# Patient Record
Sex: FEMALE | Race: WHITE | Employment: UNEMPLOYED | URBAN - METROPOLITAN AREA
[De-identification: names, ages, dates, MRNs, and addresses within clinical notes are randomized per-mention and may not be internally consistent; named-entity substitution may affect disease eponyms.]

---

## 2017-11-08 ENCOUNTER — OFFICE VISIT (OUTPATIENT)
Dept: GASTROENTEROLOGY | Facility: CLINIC | Age: 57
End: 2017-11-08

## 2017-11-08 ENCOUNTER — TELEPHONE (OUTPATIENT)
Dept: GASTROENTEROLOGY | Facility: CLINIC | Age: 57
End: 2017-11-08

## 2017-11-08 VITALS
WEIGHT: 173 LBS | HEART RATE: 70 BPM | SYSTOLIC BLOOD PRESSURE: 126 MMHG | HEIGHT: 64 IN | BODY MASS INDEX: 29.53 KG/M2 | DIASTOLIC BLOOD PRESSURE: 82 MMHG

## 2017-11-08 DIAGNOSIS — Z12.12 ENCOUNTER FOR COLORECTAL CANCER SCREENING: Primary | ICD-10-CM

## 2017-11-08 DIAGNOSIS — Z12.11 ENCOUNTER FOR COLORECTAL CANCER SCREENING: Primary | ICD-10-CM

## 2017-11-08 PROCEDURE — 99212 OFFICE O/P EST SF 10 MIN: CPT | Performed by: INTERNAL MEDICINE

## 2017-11-08 PROCEDURE — 99203 OFFICE O/P NEW LOW 30 MIN: CPT | Performed by: INTERNAL MEDICINE

## 2017-11-08 RX ORDER — FLUTICASONE PROPIONATE 50 MCG
SPRAY, SUSPENSION (ML) NASAL
COMMUNITY

## 2017-11-08 RX ORDER — LISINOPRIL AND HYDROCHLOROTHIAZIDE 12.5; 1 MG/1; MG/1
TABLET ORAL
Refills: 0 | COMMUNITY
Start: 2017-11-06

## 2017-11-08 RX ORDER — CITALOPRAM 40 MG/1
TABLET ORAL
COMMUNITY
Start: 2016-11-14

## 2017-11-08 RX ORDER — METFORMIN HYDROCHLORIDE 500 MG/1
TABLET, EXTENDED RELEASE ORAL
Refills: 2 | COMMUNITY
Start: 2017-11-07

## 2017-11-08 RX ORDER — VALACYCLOVIR HYDROCHLORIDE 500 MG/1
500 TABLET, FILM COATED ORAL
COMMUNITY

## 2017-11-08 NOTE — TELEPHONE ENCOUNTER
RALPH    RN's    Dr. Eliane James ordered this pt to hold her Metformin the day before and the day of procedure. \"3. Medication adjustment:   A. Day BEFORE colonoscopy: HOLD metformin   B. Day OF colonoscopy: HOLD metformin   C.  Continue ALL other medicatio

## 2017-11-08 NOTE — TELEPHONE ENCOUNTER
Scheduled for:  Colonoscopy 59579  Provider Name: Dr. Bismark Ahuja  Date:  12/11/17  Location:  [facility]  Sedation:  IV sedation  Time:  9:30am , arrival 8:30 am   Prep: Suprep  Meds/Allergies Reconciled?:  Physician reviewed  3. Medication adjustment:   A.  Day BEFORE co

## 2017-11-08 NOTE — PATIENT INSTRUCTIONS
1. Schedule colonoscopy with Iv/conscious sedation with Dr. Viviane Arrington    2.  bowel prep from pharmacy ([facility])    3. Medication adjustment:       A. Day BEFORE colonoscopy: HOLD metformin     B. Day OF colonoscopy: HOLD metformin     C.  Contin

## 2017-11-08 NOTE — H&P
[facility], [facility] - Gastroenterology                                                                                                  Clinic History and Physical ROS:   all 10 systems were reviewed and were negative except as noted in the HPI    PHYSICAL EXAM:   Height 5' 4\" (1.626 m), weight 173 lb (78.5 kg).     General:awake, cooperative, no acute distress  HEENT: EOMI, no scleral icterus, MMM Imaging & Referrals:  None       Payt Joyner MD  [facility], [facility] - Gastroenterology  11/8/2017  9:11 AM

## 2017-12-11 ENCOUNTER — SURGERY (OUTPATIENT)
Age: 57
End: 2017-12-11

## 2017-12-11 ENCOUNTER — HOSPITAL ENCOUNTER (OUTPATIENT)
Facility: HOSPITAL | Age: 57
Setting detail: HOSPITAL OUTPATIENT SURGERY
Discharge: HOME | End: 2017-12-11
Attending: INTERNAL MEDICINE | Admitting: INTERNAL MEDICINE
Payer: COMMERCIAL

## 2017-12-11 DIAGNOSIS — Z12.11 ENCOUNTER FOR COLORECTAL CANCER SCREENING: ICD-10-CM

## 2017-12-11 DIAGNOSIS — Z12.12 ENCOUNTER FOR COLORECTAL CANCER SCREENING: ICD-10-CM

## 2017-12-11 PROCEDURE — 0DBK8ZX EXCISION OF ASCENDING COLON, VIA NATURAL OR ARTIFICIAL OPENING ENDOSCOPIC, DIAGNOSTIC: ICD-10-PCS | Performed by: INTERNAL MEDICINE

## 2017-12-11 PROCEDURE — 99153 MOD SED SAME PHYS/QHP EA: CPT | Performed by: INTERNAL MEDICINE

## 2017-12-11 PROCEDURE — G0500 MOD SEDAT ENDO SERVICE >5YRS: HCPCS | Performed by: INTERNAL MEDICINE

## 2017-12-11 PROCEDURE — 45380 COLONOSCOPY AND BIOPSY: CPT | Performed by: INTERNAL MEDICINE

## 2017-12-11 RX ORDER — SODIUM CHLORIDE, SODIUM LACTATE, POTASSIUM CHLORIDE, CALCIUM CHLORIDE 600; 310; 30; 20 MG/100ML; MG/100ML; MG/100ML; MG/100ML
INJECTION, SOLUTION INTRAVENOUS CONTINUOUS
Status: DISCONTINUED | OUTPATIENT
Start: 2017-12-11 | End: 2017-12-11

## 2017-12-11 RX ORDER — MIDAZOLAM HYDROCHLORIDE 1 MG/ML
1 INJECTION INTRAMUSCULAR; INTRAVENOUS EVERY 5 MIN PRN
Status: DISCONTINUED | OUTPATIENT
Start: 2017-12-11 | End: 2017-12-11

## 2017-12-11 RX ORDER — MIDAZOLAM HYDROCHLORIDE 1 MG/ML
INJECTION INTRAMUSCULAR; INTRAVENOUS
Status: DISCONTINUED | OUTPATIENT
Start: 2017-12-11 | End: 2017-12-11

## 2017-12-11 RX ORDER — SODIUM CHLORIDE 0.9 % (FLUSH) 0.9 %
10 SYRINGE (ML) INJECTION AS NEEDED
Status: DISCONTINUED | OUTPATIENT
Start: 2017-12-11 | End: 2017-12-11

## 2017-12-11 NOTE — H&P
History & Physical Examination    Patient Name: Sin Berry  MRN: [id number]  [facility]: [id number]  YOB: 1960    Diagnosis: colorectal cancer screening      Prescriptions Prior to Admission:  Citalopram Hydrobromide 40 MG Oral Tab TAKE 1 TABLET and alternatives of the procedure with the patient/family. They understand and agree to proceed with plan of care. I have reviewed the History and Physical done within the last 30 days. Any changes noted above.   Fredi Jesus MD  [facility], [facility] -

## 2017-12-11 NOTE — OPERATIVE REPORT
Colonoscopy Report    Mike Tejeda     1960 Age 65year old   PCP Geremias Landry Endoscopist Debora Scott MD     Date of procedure: 17    Procedure: Colonoscopy w/ biopsy    Pre-operative diagnosis: colorectal cancer screening immediate postoperative complications. The patient’s vital signs were monitored throughout the procedure and remained stable.     Estimated blood loss: insignificant    Specimens collected:  Colon polyp    Complications: none     Colonoscopy findings:  Term

## 2017-12-12 ENCOUNTER — TELEPHONE (OUTPATIENT)
Dept: GASTROENTEROLOGY | Facility: CLINIC | Age: 57
End: 2017-12-12

## 2017-12-12 VITALS
HEART RATE: 56 BPM | SYSTOLIC BLOOD PRESSURE: 119 MMHG | DIASTOLIC BLOOD PRESSURE: 79 MMHG | OXYGEN SATURATION: 95 % | HEIGHT: 63 IN | WEIGHT: 170 LBS | RESPIRATION RATE: 12 BRPM | BODY MASS INDEX: 30.12 KG/M2

## 2017-12-12 NOTE — TELEPHONE ENCOUNTER
Message   Received: Yesterday   Message Contents   Hay Car MD  [facility] Clinical Staff             GI staff: please place recall for colonoscopy in 5 years      Results letter mailed to patient and colon recall entered for 5 years.

## 2018-01-10 ENCOUNTER — TELEPHONE (OUTPATIENT)
Dept: GASTROENTEROLOGY | Facility: CLINIC | Age: 58
End: 2018-01-10

## 2018-01-10 NOTE — TELEPHONE ENCOUNTER
Daphne Arnold requesting copies of pt's CLN results - pls fax to [phone] attn: Dr. Fidelia Carmona . Pls call. Thank you.

## 2018-01-10 NOTE — TELEPHONE ENCOUNTER
Spoke to pt received verbal consent to fax over Pankaj Rose 65 results to Dr. Severiano Kern office. CLN report faxed over to Dr. Severiano Kern office, confirmation sheet received. No further action needed at this time.

## 2020-01-05 ENCOUNTER — HOSPITAL ENCOUNTER (OUTPATIENT)
Age: 60
Discharge: HOME | End: 2020-01-05
Attending: EMERGENCY MEDICINE
Payer: COMMERCIAL

## 2020-01-05 VITALS
DIASTOLIC BLOOD PRESSURE: 91 MMHG | HEART RATE: 68 BPM | OXYGEN SATURATION: 97 % | SYSTOLIC BLOOD PRESSURE: 136 MMHG | BODY MASS INDEX: 28 KG/M2 | TEMPERATURE: 98 F | RESPIRATION RATE: 18 BRPM | WEIGHT: 160 LBS

## 2020-01-05 DIAGNOSIS — J01.90 ACUTE NON-RECURRENT SINUSITIS, UNSPECIFIED LOCATION: Primary | ICD-10-CM

## 2020-01-05 DIAGNOSIS — R05.3 PERSISTENT COUGH: ICD-10-CM

## 2020-01-05 PROCEDURE — 99213 OFFICE O/P EST LOW 20 MIN: CPT

## 2020-01-05 PROCEDURE — 99204 OFFICE O/P NEW MOD 45 MIN: CPT

## 2020-01-05 RX ORDER — FLUTICASONE PROPIONATE 50 MCG
2 SPRAY, SUSPENSION (ML) NASAL DAILY
Qty: 16 G | Refills: 0 | Status: SHIPPED | OUTPATIENT
Start: 2020-01-05 | End: 2020-02-04

## 2020-01-05 RX ORDER — AMOXICILLIN 875 MG/1
875 TABLET, COATED ORAL 2 TIMES DAILY
Qty: 20 TABLET | Refills: 0 | Status: SHIPPED | OUTPATIENT
Start: 2020-01-05 | End: 2020-01-15

## 2020-01-05 RX ORDER — BENZONATATE 100 MG/1
100 CAPSULE ORAL 3 TIMES DAILY PRN
Qty: 30 CAPSULE | Refills: 0 | Status: SHIPPED | OUTPATIENT
Start: 2020-01-05 | End: 2020-02-04

## 2020-01-05 NOTE — ED PROVIDER NOTES
Patient Seen in: [facility] In [address] [place] [address]      History   Patient presents with:  Cough/URI    Stated Complaint: face pressure    HPI    The patient is a 43-year-old female with past history of borderline diabetes, hypertension who pre SpO2 97%   BMI 28.34 kg/m²         Physical Exam    Constitutional: Well-developed well-nourished in no acute distress  Head: Normocephalic, no swelling or tenderness  Eyes: Nonicteric sclera, no conjunctival injection  ENT: TMs are clear and flat bilater

## 2021-08-20 ENCOUNTER — HOSPITAL ENCOUNTER (OUTPATIENT)
Age: 61
Discharge: HOME | End: 2021-08-20
Payer: COMMERCIAL

## 2021-08-20 VITALS
DIASTOLIC BLOOD PRESSURE: 80 MMHG | TEMPERATURE: 97 F | HEIGHT: 62 IN | HEART RATE: 76 BPM | OXYGEN SATURATION: 99 % | SYSTOLIC BLOOD PRESSURE: 126 MMHG | BODY MASS INDEX: 29.81 KG/M2 | RESPIRATION RATE: 18 BRPM | WEIGHT: 162 LBS

## 2021-08-20 DIAGNOSIS — R30.0 DYSURIA: Primary | ICD-10-CM

## 2021-08-20 DIAGNOSIS — N39.0 URINARY TRACT INFECTION WITHOUT HEMATURIA, SITE UNSPECIFIED: ICD-10-CM

## 2021-08-20 LAB
BILIRUB UR QL STRIP: NEGATIVE
COLOR UR: YELLOW
GLUCOSE UR STRIP-MCNC: NEGATIVE MG/DL
NITRITE UR QL STRIP: POSITIVE
PH UR STRIP: 7 [PH]
PROT UR STRIP-MCNC: 100 MG/DL
SP GR UR STRIP: 1.02
UROBILINOGEN UR STRIP-ACNC: <2 MG/DL

## 2021-08-20 PROCEDURE — 99203 OFFICE O/P NEW LOW 30 MIN: CPT | Performed by: NURSE PRACTITIONER

## 2021-08-20 PROCEDURE — 87186 SC STD MICRODIL/AGAR DIL: CPT | Performed by: NURSE PRACTITIONER

## 2021-08-20 PROCEDURE — 81002 URINALYSIS NONAUTO W/O SCOPE: CPT | Performed by: NURSE PRACTITIONER

## 2021-08-20 PROCEDURE — 87088 URINE BACTERIA CULTURE: CPT | Performed by: NURSE PRACTITIONER

## 2021-08-20 PROCEDURE — 87086 URINE CULTURE/COLONY COUNT: CPT | Performed by: NURSE PRACTITIONER

## 2021-08-20 RX ORDER — NITROFURANTOIN 25; 75 MG/1; MG/1
100 CAPSULE ORAL 2 TIMES DAILY
Qty: 20 CAPSULE | Refills: 0 | Status: SHIPPED | OUTPATIENT
Start: 2021-08-20 | End: 2021-08-30

## 2021-08-20 RX ORDER — PHENAZOPYRIDINE HYDROCHLORIDE 100 MG/1
100 TABLET, FILM COATED ORAL 3 TIMES DAILY PRN
Qty: 6 TABLET | Refills: 0 | Status: SHIPPED | OUTPATIENT
Start: 2021-08-20 | End: 2021-08-27

## 2021-08-20 NOTE — ED PROVIDER NOTES
Patient Seen in: [facility] [place]    History   Patient presents with:  Urinary Symptoms    Stated Complaint: UTI    HPI    Patient complains of urinary frequency, urgency and dysuria that began 4 days ago.   Today is the worst.  Patient denies fever Labs Reviewed   [facility] POCT URINALYSIS DIPSTICK - Abnormal; Notable for the following components:       Result Value    Urine Clarity Slightly cloudy (*)     Protein urine 100  (*)     Ketone, Urine Trace (*)     Blood, Urine Small (*)     Nitrite Urine P 100 MG Oral Cap  Take 1 capsule (100 mg total) by mouth 2 (two) times daily for 10 days. Qty: 20 capsule Refills: 0    phenazopyridine 100 MG Oral Tab  Take 1 tablet (100 mg total) by mouth 3 (three) times daily as needed for Pain.   Qty: 6 tablet Refills: 1

## 2022-06-07 ENCOUNTER — HOSPITAL ENCOUNTER (OUTPATIENT)
Age: 62
Discharge: HOME | End: 2022-06-07
Payer: COMMERCIAL

## 2022-06-07 VITALS
TEMPERATURE: 98 F | DIASTOLIC BLOOD PRESSURE: 75 MMHG | SYSTOLIC BLOOD PRESSURE: 141 MMHG | WEIGHT: 160 LBS | RESPIRATION RATE: 15 BRPM | HEART RATE: 79 BPM | HEIGHT: 62 IN | BODY MASS INDEX: 29.44 KG/M2 | OXYGEN SATURATION: 99 %

## 2022-06-07 DIAGNOSIS — J98.8 VIRAL RESPIRATORY ILLNESS: Primary | ICD-10-CM

## 2022-06-07 DIAGNOSIS — B97.89 VIRAL RESPIRATORY ILLNESS: Primary | ICD-10-CM

## 2022-06-07 LAB
S PYO AG THROAT QL: NEGATIVE
SARS-COV-2 RNA RESP QL NAA+PROBE: NOT DETECTED

## 2022-06-07 PROCEDURE — 87880 STREP A ASSAY W/OPTIC: CPT | Performed by: NURSE PRACTITIONER

## 2022-06-07 PROCEDURE — 99213 OFFICE O/P EST LOW 20 MIN: CPT | Performed by: NURSE PRACTITIONER

## 2022-06-07 PROCEDURE — U0002 COVID-19 LAB TEST NON-CDC: HCPCS | Performed by: NURSE PRACTITIONER

## 2022-06-07 NOTE — ED INITIAL ASSESSMENT (HPI)
Patient presents a&o4/4 with complaints of headache, sore throat, cough since Sunday. Patient denies fever/chills.  NAD, respirations even and nonlabored

## 2022-10-11 ENCOUNTER — TELEPHONE (OUTPATIENT)
Dept: GASTROENTEROLOGY | Facility: CLINIC | Age: 62
End: 2022-10-11

## 2022-10-11 NOTE — TELEPHONE ENCOUNTER
----- Message from [address], [address] sent at 10/11/2022  8:12 AM CDT -----  Regarding: Recall Colon  Marvin Putnam CMA  [facility] Clinical Staff  Recall colon in 5 years per MG.  Colon done 12-11-17

## 2022-10-18 ENCOUNTER — TELEPHONE (OUTPATIENT)
Facility: CLINIC | Age: 62
End: 2022-10-18

## 2022-10-18 DIAGNOSIS — Z86.010 HISTORY OF ADENOMATOUS POLYP OF COLON: ICD-10-CM

## 2022-10-18 DIAGNOSIS — Z12.11 SCREENING FOR COLON CANCER: Primary | ICD-10-CM

## 2022-10-20 NOTE — TELEPHONE ENCOUNTER
Last Procedure, Date, MD: Colonoscopy, Dr. Sarita Cummings, 12/1/17   Last Diagnosis: tubular adenoma   Recalled for (mth/yrs): 5 years  Sedation used previously: IV   Last Prep Used (if known): n/a   Quality of prep (if known): good  Anticoagulants: n/a  Diabetic Meds: metformin  BP meds(Ace inhibitors/ARB's): lisinopril  Weight loss meds (phentermine/vyvanse): n/a  Iron supplement (RX/OTC): n/a  Height & Weight/BMI: 5'3\"/160lbs/28.3  Hx of Cardiac/CVA issues/(MI/Stroke): n/a  Devices Pacemaker/Defibrillator/Stents: n/a  Resp. Issues/Oxygen Use/JOSE/COPD: n/a  Issues w/Anesthesia: n/a    Symptoms (Y/N): N  Symptoms Details: n/a     Special comments/notes: verified allergies, medications, pharmacy    Patient requested to schedule before end of year since she met her deductible. Please advise on orders and prep, thank you.

## 2022-10-20 NOTE — TELEPHONE ENCOUNTER
Ok to schedule colonoscopy with MAC with split golytely for colorectal cancer screening and history of adenomatous colon polyp at [facility] or eosc    Hold metformin the day before and day of the procedure    Thanks    Gladys Thomason MD  [facility], [facility] - Gastroenterology

## 2022-11-16 NOTE — TELEPHONE ENCOUNTER
Scheduled for:  Colonoscopy 90700  Provider Name: Dr Cesilia George    Date:  2/21/23  Location:  [facility]   Sedation:  MAC  Time:  1:45pm, arrival 12:45pm   Prep:  Golytely   Meds/Allergies Reconciled?:  Physician reviewed     Diagnosis with codes:  Screening for colon cancer Z12.11, Hx of adenomatous colon polyps Z86.010  Was patient informed to call insurance with codes (Y/N):  Yes      Referral sent?:  Referral was sent at the time of electronic surgical scheduling. [facility] or [address] notified?:  I sent an electronic request to Endo Scheduling and received a confirmation today.      Medication Orders:  Hold Metformin the day before and morning of procedure   Hold Lisinopril-Hydrochlorothiazide the night before and morning of procedure   Misc Orders:  N/A      Further instructions given by staff:  Prep instructions were sent via [email or web address] per patient request

## 2023-01-03 ENCOUNTER — HOSPITAL ENCOUNTER (OUTPATIENT)
Age: 63
Discharge: HOME | End: 2023-01-03
Payer: COMMERCIAL

## 2023-01-03 VITALS
TEMPERATURE: 98 F | WEIGHT: 150 LBS | BODY MASS INDEX: 26.58 KG/M2 | DIASTOLIC BLOOD PRESSURE: 70 MMHG | SYSTOLIC BLOOD PRESSURE: 119 MMHG | HEIGHT: 63 IN | HEART RATE: 83 BPM | OXYGEN SATURATION: 100 % | RESPIRATION RATE: 18 BRPM

## 2023-01-03 DIAGNOSIS — J01.90 ACUTE SINUSITIS, RECURRENCE NOT SPECIFIED, UNSPECIFIED LOCATION: Primary | ICD-10-CM

## 2023-01-03 PROCEDURE — 99213 OFFICE O/P EST LOW 20 MIN: CPT | Performed by: NURSE PRACTITIONER

## 2023-01-03 RX ORDER — AMOXICILLIN AND CLAVULANATE POTASSIUM 875; 125 MG/1; MG/1
1 TABLET, FILM COATED ORAL 2 TIMES DAILY
Qty: 20 TABLET | Refills: 0 | Status: SHIPPED | OUTPATIENT
Start: 2023-01-03 | End: 2023-01-13

## 2023-01-04 NOTE — DISCHARGE INSTRUCTIONS
Push fluids. Rest.  Tylenol or ibuprofen as needed for pain or fever. Take the antibiotics as prescribed. Follow-up with your doctor. Return for any concerns.

## 2023-01-04 NOTE — ED INITIAL ASSESSMENT (HPI)
Pt reports cough, sinus pain, nasal congestion. This is day 9 of symptoms. At home covid test negative.

## 2023-02-15 RX ORDER — VENLAFAXINE HYDROCHLORIDE 150 MG/1
150 CAPSULE, EXTENDED RELEASE ORAL DAILY
COMMUNITY

## 2023-02-17 ENCOUNTER — TELEPHONE (OUTPATIENT)
Facility: CLINIC | Age: 63
End: 2023-02-17

## 2023-02-17 NOTE — TELEPHONE ENCOUNTER
Dr. Alessandra Rivera (office on call),    Patient requesting prep for procedure on 2/21. Orders pended below. Please review and sign if appropriate, thank you.

## 2023-02-21 ENCOUNTER — ANESTHESIA EVENT (OUTPATIENT)
Dept: ENDOSCOPY | Age: 63
End: 2023-02-21
Payer: COMMERCIAL

## 2023-02-21 ENCOUNTER — HOSPITAL ENCOUNTER (OUTPATIENT)
Age: 63
Setting detail: HOSPITAL OUTPATIENT SURGERY
Discharge: HOME | End: 2023-02-21
Attending: INTERNAL MEDICINE | Admitting: INTERNAL MEDICINE
Payer: COMMERCIAL

## 2023-02-21 ENCOUNTER — ANESTHESIA (OUTPATIENT)
Dept: ENDOSCOPY | Age: 63
End: 2023-02-21
Payer: COMMERCIAL

## 2023-02-21 VITALS
OXYGEN SATURATION: 99 % | SYSTOLIC BLOOD PRESSURE: 147 MMHG | WEIGHT: 160 LBS | HEART RATE: 58 BPM | HEIGHT: 63 IN | DIASTOLIC BLOOD PRESSURE: 91 MMHG | BODY MASS INDEX: 28.35 KG/M2 | RESPIRATION RATE: 15 BRPM

## 2023-02-21 DIAGNOSIS — Z12.11 SCREENING FOR COLON CANCER: ICD-10-CM

## 2023-02-21 DIAGNOSIS — Z86.010 HISTORY OF ADENOMATOUS POLYP OF COLON: ICD-10-CM

## 2023-02-21 LAB — GLUCOSE BLDC GLUCOMTR-MCNC: 132 MG/DL (ref 70–99)

## 2023-02-21 PROCEDURE — 88305 TISSUE EXAM BY PATHOLOGIST: CPT | Performed by: INTERNAL MEDICINE

## 2023-02-21 PROCEDURE — 82962 GLUCOSE BLOOD TEST: CPT

## 2023-02-21 RX ORDER — LIDOCAINE HYDROCHLORIDE 10 MG/ML
INJECTION, SOLUTION EPIDURAL; INFILTRATION; INTRACAUDAL; PERINEURAL AS NEEDED
Status: DISCONTINUED | OUTPATIENT
Start: 2023-02-21 | End: 2023-02-21 | Stop reason: SURG

## 2023-02-21 RX ORDER — SODIUM CHLORIDE, SODIUM LACTATE, POTASSIUM CHLORIDE, CALCIUM CHLORIDE 600; 310; 30; 20 MG/100ML; MG/100ML; MG/100ML; MG/100ML
INJECTION, SOLUTION INTRAVENOUS CONTINUOUS
Status: DISCONTINUED | OUTPATIENT
Start: 2023-02-21 | End: 2023-02-21

## 2023-02-21 RX ADMIN — SODIUM CHLORIDE, SODIUM LACTATE, POTASSIUM CHLORIDE, CALCIUM CHLORIDE: 600; 310; 30; 20 INJECTION, SOLUTION INTRAVENOUS at 13:15:00

## 2023-02-21 RX ADMIN — LIDOCAINE HYDROCHLORIDE 50 MG: 10 INJECTION, SOLUTION EPIDURAL; INFILTRATION; INTRACAUDAL; PERINEURAL at 13:15:00

## 2023-02-21 NOTE — ANESTHESIA POSTPROCEDURE EVALUATION
Patient: Bright Rosas    Procedure Summary     Date: 02/21/23 Room / Location: [facility] ENDOSCOPY 01 / [facility] ENDO    Anesthesia Start: 0181 Anesthesia Stop: 8723    Procedure: colonoscopy Diagnosis:       Screening for colon cancer      History of adenomatous polyp of colon      (diverticulosis, hemorrhoids)    Surgeons: Shelly Voss MD Anesthesiologist:     Anesthesia Type: MAC ASA Status: 2          Anesthesia Type: MAC    Vitals Value Taken Time   /74 02/21/23 1344   Temp  02/21/23 1345   Pulse 67 02/21/23 1344   Resp 22 02/21/23 1344   SpO2 99 % 02/21/23 1344   Vitals shown include unvalidated device data.     [facility] AN Post Evaluation:   Patient Evaluated in PACU  Patient Participation: complete - patient participated  Level of Consciousness: awake and alert  Pain Management: adequate  Airway Patency:patent  Yes    Cardiovascular Status: acceptable  Respiratory Status: acceptable      Nguyen Ley MD  2/21/2023 1:45 PM

## 2023-02-21 NOTE — DISCHARGE INSTRUCTIONS
Home Care Instructions for Colonoscopy with Sedation    Diet:  - Resume your regular diet as tolerated unless otherwise instructed. - Start with light meals to minimize bloating.  - Do not drink alcohol today. Medication:  - If you have questions about resuming your normal medications, please contact your Primary Care Physician. Activities:  - Take it easy today. Do not return to work today. - Do not drive today. - Do not operate any machinery today (including kitchen equipment). Colonoscopy:  - You may notice some rectal \"spotting\" (a little blood on the toilet tissue) for a day or two after the exam. This is normal.  - If you experience any rectal bleeding (not spotting), persistent tenderness or sharp severe abdominal pains, oral temperature over 100 degrees Fahrenheit, light-headedness or dizziness, or any other problems, contact your doctor. **If unable to reach your doctor, please go to the [facility] AND [facility] Emergency Room**    - Your referring physician will receive a full report of your examination.  - If you do not hear from your doctor's office within two weeks of your biopsy, please call them for your results. You may be able to see your laboratory results in [facility] between 4 and 7 business days. In some cases, your physician may not have viewed the results before they are released to [address] If you have questions regarding your results contact the physician who ordered the test/exam by phone or via [address] by choosing \"Ask a Medical Question. \"

## 2023-02-22 ENCOUNTER — MED REC SCAN ONLY (OUTPATIENT)
Facility: CLINIC | Age: 63
End: 2023-02-22

## 2023-02-22 ENCOUNTER — TELEPHONE (OUTPATIENT)
Dept: GASTROENTEROLOGY | Facility: CLINIC | Age: 63
End: 2023-02-22

## 2023-02-22 NOTE — TELEPHONE ENCOUNTER
Lorenzo Robles MD  [facility] Clinical Staff  GI staff: please place recall in for colonoscopy in 10 years       Letter sent on 2/22/2023  [facility] Updated   Patient Outreach Updated

## 2023-03-03 ENCOUNTER — TELEPHONE (OUTPATIENT)
Facility: CLINIC | Age: 63
End: 2023-03-03

## 2023-03-03 NOTE — TELEPHONE ENCOUNTER
Called and spoke to the patient,  verified. She had the procedure at [facility] on 2023. She lost her  license. Called [facility], no answer. Asked the patient to call back on Monday.

## 2023-03-03 NOTE — TELEPHONE ENCOUNTER
Pt is requesting to speak directly to [address] or Rn, she states she is missing some belongings her ID and she last had it when at the procedure.  Pt is wondering if anything has been found please call thanks

## 2023-03-29 ENCOUNTER — HOSPITAL ENCOUNTER (OUTPATIENT)
Age: 63
Discharge: HOME | End: 2023-03-29
Payer: COMMERCIAL

## 2023-03-29 VITALS
HEART RATE: 89 BPM | SYSTOLIC BLOOD PRESSURE: 147 MMHG | DIASTOLIC BLOOD PRESSURE: 77 MMHG | OXYGEN SATURATION: 97 % | TEMPERATURE: 97 F | RESPIRATION RATE: 18 BRPM

## 2023-03-29 DIAGNOSIS — J32.9 RECURRENT SINUSITIS: Primary | ICD-10-CM

## 2023-03-29 PROCEDURE — 99213 OFFICE O/P EST LOW 20 MIN: CPT | Performed by: NURSE PRACTITIONER

## 2023-03-29 RX ORDER — DOXYCYCLINE HYCLATE 100 MG/1
100 CAPSULE ORAL 2 TIMES DAILY
Qty: 14 CAPSULE | Refills: 0 | Status: SHIPPED | OUTPATIENT
Start: 2023-03-29 | End: 2023-04-05

## 2023-03-29 NOTE — DISCHARGE INSTRUCTIONS
Take doxycycline daily. Continue over-the-counter remedies. Follow-up with your primary doctor.   Consider ENT consult if sinus symptoms are recurrent

## 2025-03-20 ENCOUNTER — HOSPITAL ENCOUNTER (OUTPATIENT)
Age: 65
Discharge: ANOTHER HOSPITAL | End: 2025-03-20
Payer: COMMERCIAL

## 2025-03-20 ENCOUNTER — HOSPITAL ENCOUNTER (EMERGENCY)
Facility: HOSPITAL | Age: 65
Discharge: HOME | End: 2025-03-20
Attending: EMERGENCY MEDICINE
Payer: COMMERCIAL

## 2025-03-20 ENCOUNTER — APPOINTMENT (OUTPATIENT)
Dept: CT IMAGING | Facility: HOSPITAL | Age: 65
End: 2025-03-20
Attending: EMERGENCY MEDICINE
Payer: COMMERCIAL

## 2025-03-20 VITALS
DIASTOLIC BLOOD PRESSURE: 69 MMHG | HEIGHT: 63 IN | SYSTOLIC BLOOD PRESSURE: 110 MMHG | OXYGEN SATURATION: 97 % | TEMPERATURE: 98 F | HEART RATE: 65 BPM | WEIGHT: 148 LBS | RESPIRATION RATE: 18 BRPM | BODY MASS INDEX: 26.22 KG/M2

## 2025-03-20 VITALS
SYSTOLIC BLOOD PRESSURE: 127 MMHG | RESPIRATION RATE: 18 BRPM | HEART RATE: 69 BPM | TEMPERATURE: 98 F | DIASTOLIC BLOOD PRESSURE: 63 MMHG | OXYGEN SATURATION: 99 %

## 2025-03-20 DIAGNOSIS — R10.32 LLQ PAIN: Primary | ICD-10-CM

## 2025-03-20 LAB
ALBUMIN SERPL-MCNC: 5 G/DL (ref 3.2–4.8)
ALBUMIN/GLOB SERPL: 2.4 {RATIO} (ref 1–2)
ALP LIVER SERPL-CCNC: 36 U/L
ALT SERPL-CCNC: 18 U/L
ANION GAP SERPL CALC-SCNC: 6 MMOL/L (ref 0–18)
AST SERPL-CCNC: 18 U/L (ref ?–34)
BASOPHILS # BLD AUTO: 0.05 X10(3) UL (ref 0–0.2)
BASOPHILS NFR BLD AUTO: 0.5 %
BILIRUB SERPL-MCNC: 0.4 MG/DL (ref 0.2–1.1)
BILIRUB UR QL: NEGATIVE
BUN BLD-MCNC: 17 MG/DL (ref 9–23)
BUN/CREAT SERPL: 22.7 (ref 10–20)
CALCIUM BLD-MCNC: 9.7 MG/DL (ref 8.7–10.4)
CHLORIDE SERPL-SCNC: 106 MMOL/L (ref 98–112)
CLARITY UR: CLEAR
CO2 SERPL-SCNC: 25 MMOL/L (ref 21–32)
COLOR UR: COLORLESS
CREAT BLD-MCNC: 0.75 MG/DL
DEPRECATED RDW RBC AUTO: 40.5 FL (ref 35.1–46.3)
EGFRCR SERPLBLD CKD-EPI 2021: 89 ML/MIN/1.73M2 (ref 60–?)
EOSINOPHIL # BLD AUTO: 0.19 X10(3) UL (ref 0–0.7)
EOSINOPHIL NFR BLD AUTO: 1.8 %
ERYTHROCYTE [DISTWIDTH] IN BLOOD BY AUTOMATED COUNT: 12.5 % (ref 11–15)
GLOBULIN PLAS-MCNC: 2.1 G/DL (ref 2–3.5)
GLUCOSE BLD-MCNC: 106 MG/DL (ref 70–99)
GLUCOSE UR-MCNC: NORMAL MG/DL
HCT VFR BLD AUTO: 35.6 %
HGB BLD-MCNC: 12.6 G/DL
HGB UR QL STRIP.AUTO: NEGATIVE
IMM GRANULOCYTES # BLD AUTO: 0.04 X10(3) UL (ref 0–1)
IMM GRANULOCYTES NFR BLD: 0.4 %
KETONES UR-MCNC: NEGATIVE MG/DL
LEUKOCYTE ESTERASE UR QL STRIP.AUTO: NEGATIVE
LYMPHOCYTES # BLD AUTO: 2.12 X10(3) UL (ref 1–4)
LYMPHOCYTES NFR BLD AUTO: 19.9 %
MCH RBC QN AUTO: 31.4 PG (ref 26–34)
MCHC RBC AUTO-ENTMCNC: 35.4 G/DL (ref 31–37)
MCV RBC AUTO: 88.8 FL
MONOCYTES # BLD AUTO: 0.91 X10(3) UL (ref 0.1–1)
MONOCYTES NFR BLD AUTO: 8.5 %
NEUTROPHILS # BLD AUTO: 7.34 X10 (3) UL (ref 1.5–7.7)
NEUTROPHILS # BLD AUTO: 7.34 X10(3) UL (ref 1.5–7.7)
NEUTROPHILS NFR BLD AUTO: 68.9 %
NITRITE UR QL STRIP.AUTO: NEGATIVE
OSMOLALITY SERPL CALC.SUM OF ELEC: 286 MOSM/KG (ref 275–295)
PH UR: 5.5 [PH] (ref 5–8)
PLATELET # BLD AUTO: 254 10(3)UL (ref 150–450)
POTASSIUM SERPL-SCNC: 4 MMOL/L (ref 3.5–5.1)
PROT SERPL-MCNC: 7.1 G/DL (ref 5.7–8.2)
PROT UR-MCNC: NEGATIVE MG/DL
RBC # BLD AUTO: 4.01 X10(6)UL
SODIUM SERPL-SCNC: 137 MMOL/L (ref 136–145)
SP GR UR STRIP: 1.01 (ref 1–1.03)
UROBILINOGEN UR STRIP-ACNC: NORMAL
WBC # BLD AUTO: 10.7 X10(3) UL (ref 4–11)

## 2025-03-20 PROCEDURE — 99285 EMERGENCY DEPT VISIT HI MDM: CPT

## 2025-03-20 PROCEDURE — 96374 THER/PROPH/DIAG INJ IV PUSH: CPT

## 2025-03-20 PROCEDURE — 99215 OFFICE O/P EST HI 40 MIN: CPT | Performed by: NURSE PRACTITIONER

## 2025-03-20 PROCEDURE — 74177 CT ABD & PELVIS W/CONTRAST: CPT | Performed by: EMERGENCY MEDICINE

## 2025-03-20 PROCEDURE — 80053 COMPREHEN METABOLIC PANEL: CPT | Performed by: EMERGENCY MEDICINE

## 2025-03-20 PROCEDURE — 99284 EMERGENCY DEPT VISIT MOD MDM: CPT

## 2025-03-20 PROCEDURE — 85025 COMPLETE CBC W/AUTO DIFF WBC: CPT | Performed by: EMERGENCY MEDICINE

## 2025-03-20 PROCEDURE — 81003 URINALYSIS AUTO W/O SCOPE: CPT | Performed by: EMERGENCY MEDICINE

## 2025-03-20 RX ORDER — AMLODIPINE BESYLATE 2.5 MG/1
TABLET ORAL
COMMUNITY
Start: 2025-02-18

## 2025-03-20 RX ORDER — HYDROCHLOROTHIAZIDE 12.5 MG/1
12.5 CAPSULE ORAL DAILY
COMMUNITY

## 2025-03-20 RX ORDER — ROSUVASTATIN CALCIUM 10 MG/1
10 TABLET, COATED ORAL DAILY
COMMUNITY
Start: 2022-09-01

## 2025-03-20 RX ORDER — KETOROLAC TROMETHAMINE 15 MG/ML
15 INJECTION, SOLUTION INTRAMUSCULAR; INTRAVENOUS ONCE
Status: COMPLETED | OUTPATIENT
Start: 2025-03-20 | End: 2025-03-20

## 2025-03-20 RX ORDER — LISINOPRIL 20 MG/1
1 TABLET ORAL AS DIRECTED
COMMUNITY

## 2025-03-20 NOTE — ED INITIAL ASSESSMENT (HPI)
PT to ED, steady gait, alert and orientated x4.     PT reporting abd pain since this morning LLQ, sent by IC, reports has been having \"a cycle of diarrhea and constipation for months\".

## 2025-03-20 NOTE — ED PROVIDER NOTES
Patient Seen in: [facility]      History     Chief Complaint   Patient presents with    Abdominal Pain     Stated Complaint: abd pain    Subjective:   HPI      64-year-old female with diabetes, hypertension, dyslipidemia presents today with complaints of left lower quadrant pain that worsened today.  Patient states over the past 2 weeks she has had intermittent diarrhea and constipation that has led to left lower quadrant pain.  Patient states that she left work early today because the pain got worse.  Patient states she has been eating very clean and has increased her fruits and vegetable intake.  Patient states that she has had 2 colonoscopies in her past and the first colonoscopy she did have polyps a second colonoscopy no polyps.    Objective:     Past Medical History:    Colon adenoma    repeat colonoscopy 12/2022    Depression    Diabetes (HCC)    Diabetes mellitus (HCC)    boarder line    Essential hypertension    High blood pressure    Hyperlipidemia    Prediabetes              Past Surgical History:   Procedure Laterality Date    Cholecystectomy  1988    Colonoscopy N/A 12/11/2017    Procedure: COLONOSCOPY;  Surgeon: Isidro Ryan MD;  Location: [facility] ENDOSCOPY    Colonoscopy  02/21/2023    Colonoscopy N/A 2/21/2023    Procedure: colonoscopy;  Surgeon: Isidro Ryan MD;  Location: [facility] ENDO    Hysterectomy      2000    Total abdom hysterectomy  1999                Social History     Socioeconomic History    Marital status:    Tobacco Use    Smoking status: Never    Smokeless tobacco: Never   Substance and Sexual Activity    Alcohol use: Yes     Comment: wine, 2-3 nights a week    Drug use: No   Social History Narrative    ** Merged History Encounter **          Social Drivers of Health      Received from [facility]              Review of Systems   Constitutional: Negative.    HENT: Negative.     Eyes: Negative.    Respiratory: Negative.     Cardiovascular:  Negative.    Gastrointestinal:  Positive for abdominal pain, constipation and diarrhea.   Endocrine: Negative.    Genitourinary: Negative.    Musculoskeletal: Negative.    Skin: Negative.    Allergic/Immunologic: Negative.    Neurological: Negative.    Hematological: Negative.    Psychiatric/Behavioral: Negative.         Positive for stated complaint: abd pain  Other systems are as noted in HPI.  Constitutional and vital signs reviewed.      All other systems reviewed and negative except as noted above.    Physical Exam     ED Triage Vitals [03/20/25 1743]   /63   Pulse 69   Resp 18   Temp 98 °F (36.7 °C)   Temp src Oral   SpO2 99 %   O2 Device None (Room air)       Current Vitals:   Vital Signs  BP: 127/63  Pulse: 69  Resp: 18  Temp: 98 °F (36.7 °C)  Temp src: Oral    Oxygen Therapy  SpO2: 99 %  O2 Device: None (Room air)        Physical Exam  Vitals and nursing note reviewed. Exam conducted with a chaperone present.   Constitutional:       Appearance: She is well-developed.   HENT:      Head: Normocephalic.   Cardiovascular:      Rate and Rhythm: Normal rate and regular rhythm.      Heart sounds: Normal heart sounds.   Pulmonary:      Effort: Pulmonary effort is normal.      Breath sounds: Normal breath sounds.   Abdominal:      General: Abdomen is flat. Bowel sounds are normal.      Palpations: Abdomen is soft.      Tenderness: There is abdominal tenderness in the right lower quadrant and left lower quadrant. There is guarding and rebound.   Neurological:      General: No focal deficit present.      Mental Status: She is alert.           ED Course   Labs Reviewed - No data to display                MDM      64-year-old female with diabetes, hypertension, dyslipidemia presents today with complaints of left lower quadrant pain that worsened today.  Patient states over the past 2 weeks she has had intermittent diarrhea and constipation that has led to left lower quadrant pain.  Patient states that she left  work early today because the pain got worse.  Patient states she has been eating very clean and has increased her fruits and vegetable intake.  Patient states that she has had 2 colonoscopies in her past and the first colonoscopy she did have polyps a second colonoscopy no polyps.  Vital signs: Please see EMR.  Physical exam: Please see exam.  Differential diagnosis: Diverticulitis, diverticulosis, cystitis, appendicitis, IBS.  Based on physical exam and HPI will diagnosed with left lower quadrant pain.  Patient is being referred to the emergency room for further evaluation.  We do not have CAT scan capabilities here at this [facility] care center and at this hour will not be able to follow-up with the patient to hold and call as we will likely be closed at that time.  Instructed patient to remain n.p.o. until advised by the ED staff otherwise.  Patient's  will drive her to [place] ER.        Medical Decision Making  64-year-old female with diabetes, hypertension, dyslipidemia presents today with complaints of left lower quadrant pain that worsened today.  Patient states over the past 2 weeks she has had intermittent diarrhea and constipation that has led to left lower quadrant pain.  Patient states that she left work early today because the pain got worse.  Patient states she has been eating very clean and has increased her fruits and vegetable intake.  Patient states that she has had 2 colonoscopies in her past and the first colonoscopy she did have polyps a second colonoscopy no polyps.    Problems Addressed:  LLQ pain: acute illness or injury    Risk  Decision regarding hospitalization.        Disposition and Plan     Clinical Impression:  1. LLQ pain         Disposition:  Ic to ed  3/20/2025  5:59 pm    Follow-up:  No follow-up provider specified.        Medications Prescribed:  Discharge Medication List as of 3/20/2025  6:02 PM              Supplementary Documentation:

## 2025-03-20 NOTE — ED INITIAL ASSESSMENT (HPI)
Constant 7/10 LLQ pain that started this afternoon. Reports diarrhea and constipation that have been alternating over the past 2 weeks.

## 2025-03-21 ENCOUNTER — OFFICE VISIT (OUTPATIENT)
Facility: CLINIC | Age: 65
End: 2025-03-21
Payer: COMMERCIAL

## 2025-03-21 VITALS
HEART RATE: 78 BPM | WEIGHT: 145 LBS | BODY MASS INDEX: 25.69 KG/M2 | HEIGHT: 63 IN | SYSTOLIC BLOOD PRESSURE: 112 MMHG | DIASTOLIC BLOOD PRESSURE: 73 MMHG

## 2025-03-21 DIAGNOSIS — K59.00 CONSTIPATION, UNSPECIFIED CONSTIPATION TYPE: ICD-10-CM

## 2025-03-21 DIAGNOSIS — R10.30 LOWER ABDOMINAL PAIN: Primary | ICD-10-CM

## 2025-03-21 DIAGNOSIS — R19.7 DIARRHEA, UNSPECIFIED TYPE: ICD-10-CM

## 2025-03-21 PROCEDURE — 3074F SYST BP LT 130 MM HG: CPT

## 2025-03-21 PROCEDURE — 99203 OFFICE O/P NEW LOW 30 MIN: CPT

## 2025-03-21 PROCEDURE — 3078F DIAST BP <80 MM HG: CPT

## 2025-03-21 PROCEDURE — 3008F BODY MASS INDEX DOCD: CPT

## 2025-03-21 RX ORDER — POLYETHYLENE GLYCOL 3350 17 G/17G
17 POWDER, FOR SOLUTION ORAL DAILY
Qty: 238 G | Refills: 0 | Status: SHIPPED | OUTPATIENT
Start: 2025-03-21

## 2025-03-21 NOTE — ED PROVIDER NOTES
Patient Seen in: [facility] Emergency Department    History     Chief Complaint   Patient presents with    Abdomen/Flank Pain       HPI    Patient presents to the ED complaining of left lower quadrant abdominal pain since this morning.  She states that she has had intermittent diarrhea and constipation for the last several months.  Pain is new.  No other complaints.    History reviewed.   Past Medical History:    Colon adenoma    repeat colonoscopy 12/2022    Depression    Diabetes (HCC)    Diabetes mellitus (HCC)    boarder line    Essential hypertension    High blood pressure    Hyperlipidemia    Prediabetes       History reviewed.   Past Surgical History:   Procedure Laterality Date    Cholecystectomy  1988    Colonoscopy N/A 12/11/2017    Procedure: COLONOSCOPY;  Surgeon: Isidro Ryan MD;  Location: [facility] ENDOSCOPY    Colonoscopy  02/21/2023    Colonoscopy N/A 2/21/2023    Procedure: colonoscopy;  Surgeon: Isidro Ryan MD;  Location: [facility] ENDO    Hysterectomy      2000    Total abdom hysterectomy  1999         Medications :  Prescriptions Prior to Admission[1]     Family History   Problem Relation Age of Onset    Cancer Father         prostate    Cancer Paternal Aunt         breast    Cancer Paternal Aunt         breast       Smoking Status:   Social History     Socioeconomic History    Marital status:    Tobacco Use    Smoking status: Never    Smokeless tobacco: Never   Substance and Sexual Activity    Alcohol use: Yes     Comment: wine, 2-3 nights a week    Drug use: No       Constitutional and vital signs reviewed.      Social History and Family History elements reviewed from today, pertinent positives to the presenting problem noted.    Physical Exam     ED Triage Vitals [03/20/25 1847]   /69   Pulse 69   Resp 20   Temp 97.7 °F (36.5 °C)   Temp src Oral   SpO2 95 %   O2 Device None (Room air)       All measures to prevent infection transmission during my interaction with the  patient were taken. Handwashing was performed prior to and after the exam.  Stethoscope and any equipment used during my examination was cleaned with super sani-cloth germicidal wipes following the exam.     Physical Exam  Vitals and nursing note reviewed.   Constitutional:       General: She is not in acute distress.     Appearance: She is well-developed. She is not ill-appearing or toxic-appearing.   HENT:      Head: Normocephalic and atraumatic.   Eyes:      General:         Right eye: No discharge.         Left eye: No discharge.      Conjunctiva/sclera: Conjunctivae normal.   Neck:      Trachea: No tracheal deviation.   Cardiovascular:      Rate and Rhythm: Normal rate.   Pulmonary:      Effort: Pulmonary effort is normal. No respiratory distress.      Breath sounds: No stridor.   Abdominal:      General: There is no distension.      Palpations: Abdomen is soft.      Tenderness: There is abdominal tenderness in the left lower quadrant. There is no guarding or rebound.   Musculoskeletal:         General: No deformity.   Skin:     General: Skin is warm and dry.   Neurological:      Mental Status: She is alert and oriented to person, place, and time.   Psychiatric:         Mood and Affect: Mood normal.         Behavior: Behavior normal.         ED Course        Labs Reviewed   URINALYSIS WITH CULTURE REFLEX - Abnormal; Notable for the following components:       Result Value    Urine Color Colorless (*)     All other components within normal limits   COMP METABOLIC PANEL (14) - Abnormal; Notable for the following components:    Glucose 106 (*)     BUN/CREA Ratio 22.7 (*)     Alkaline Phosphatase 36 (*)     Albumin 5.0 (*)     A/G Ratio 2.4 (*)     All other components within normal limits   CBC WITH DIFFERENTIAL WITH PLATELET       As Interpreted by me    Imaging Results Available and Reviewed while in ED: CT ABDOMEN+PELVIS(CONTRAST ONLY)(CPT=74177)    Result Date: 3/20/2025  CONCLUSION:  1. Moderate to large  amount of stool in colon suggests constipation.  Colonic diverticulosis.  No diverticulitis. 2. Nonspecific generalized increase in small bowel fluid may be related to stasis. 3. 11 mm incompletely characterized right hepatic lesion.  Findings probably either represent a flash filling hemangioma or a small shunt.  Nonemergent MRI could be obtained for further evaluation.     Dictated by (CST): Abdulaziz Burciaga MD on 3/20/2025 at 9:40 PM     Finalized by (CST): Abdulaziz Burciaga MD on 3/20/2025 at 9:50 PM         ED Medications Administered:   Medications   ketorolac (Toradol) 15 MG/ML injection 15 mg (15 mg Intravenous Given 3/20/25 2053)   iopamidol 76% (ISOVUE-370) injection for power injector (80 mL Intravenous Given 3/20/25 2105)         MDM     Vitals:    03/20/25 1847 03/20/25 2228   BP: 118/69 110/69   Pulse: 69 65   Resp: 20 18   Temp: 97.7 °F (36.5 °C)    TempSrc: Oral    SpO2: 95% 97%   Weight: 67.1 kg    Height: 160 cm (5' 3\")      *I personally reviewed and interpreted all ED vitals.    Pulse Ox: 97%, Room air, Normal     Differential Diagnosis/ Diagnostic Considerations: Acute diverticulitis, perforated viscus, colitis, other    Complicating Factors: The patient already has does not have any pertinent problems on file. to contribute to the complexity of this ED evaluation.    Medical Decision Making  Patient presents to the ED with left lower quadrant abdominal pain.  Nondistressed on examination.  Laboratory testing without concerning findings and CT without significant findings.  Patient feeling better in the ED with Toradol and I feel stable for discharge with outpatient GI follow-up for chronic GI symptoms.    Problems Addressed:  LLQ pain: acute illness or injury that poses a threat to life or bodily functions    Amount and/or Complexity of Data Reviewed  Labs: ordered. Decision-making details documented in ED Course.  Radiology: ordered and independent interpretation performed. Decision-making details  documented in ED Course.     Details: I personally reviewed the patient's CT abdomen pelvis and noted no free fluid or SBO        Condition upon leaving the department: Stable    Disposition and Plan     Clinical Impression:  1. LLQ pain        Disposition:  Discharge    Follow-up:  Maciej Sen  [address]  [address]  [facility] [address]  [phone]    Schedule an appointment as soon as possible for a visit in 3 day(s)      Ben Monsivais MD  [address]  [facility] [address]  [phone]    Schedule an appointment as soon as possible for a visit in 1 week(s)        Medications Prescribed:  Discharge Medication List as of 3/20/2025 10:28 PM                           [1] (Not in a hospital admission)

## 2025-03-21 NOTE — PATIENT INSTRUCTIONS
# constipation, lower abdominal pain, diarrhea  - high fiber, increase fluids  - light exercise  - miralax twice a day for 1 month, then once a day for 1 month  - trial prunes, prune juice, papaya  - colace or dulcolax for increased constipation of lower abdominal pain.     - follow up in 2 months

## 2025-03-21 NOTE — H&P
[facility] - Gastroenterology                                                                                                                 Requesting physician or provider: KARLENE TOBIAS DO    Chief Complaint   Patient presents with    Follow - Up     ER        HPI:   Audelia Mata is a 64 year old year-old female with history of diabetes, hypertension, hyperlipidemia.   Pt was seen in the ER for left lower abdominal pain intermittent diarrhea and constipation for the last several months. Additional symptom includes bloating. Pt states that yesterday she had excruciating pain all day, which made her come to the ER. This morning the pain was pretty much gone, small bm this morning. Pt states she has tried metamucil in the past for 1 month with little to no difference.   Denies: melena, hematochezia, hematemesis, abd pain, abd surgery, loss of appetite, changes in stool or bowel habits, N/V/D, weight gain/loss    Prior endoscopies:  10/20/23  Impression:  1. Mild sigmoid colon diverticulosis  2. Very small non-bleeding hemorrhoids  3. Otherwise, unremarkable colonoscopy to the terminal ileum  Recommend:  10 yr recall  12/12/2017  Impression:  1. A 3 mm mid ascending colon polyp removed by cold biopsy forceps  2. Diverticulosis: mild in the sigmoid colon  3. Small non-bleeding hemorrohoids  Recommend:  5 yr recall    Soc:  -denies smoking  -occasional Etoh    Wt Readings from Last 6 Encounters:   03/21/25 145 lb (65.8 kg)   03/20/25 148 lb (67.1 kg)   02/15/23 160 lb (72.6 kg)   01/03/23 150 lb (68 kg)   06/07/22 160 lb (72.6 kg)   08/20/21 162 lb (73.5 kg)        History, Medications, Allergies, ROS:      Past Medical History:    Colon adenoma    repeat colonoscopy 12/2022    Depression    Diabetes (HCC)    Diabetes mellitus (HCC)    boarder line    Essential hypertension    High blood pressure    Hyperlipidemia    Prediabetes      Past Surgical History:   Procedure  Laterality Date    Cholecystectomy  1988    Colonoscopy N/A 12/11/2017    Procedure: COLONOSCOPY;  Surgeon: Isidro Ryan MD;  Location: [facility] ENDOSCOPY    Colonoscopy  02/21/2023    Colonoscopy N/A 2/21/2023    Procedure: colonoscopy;  Surgeon: Isidro Ryan MD;  Location: [facility] ENDO    Hysterectomy      2000    Total abdom hysterectomy  1999      Family Hx:   Family History   Problem Relation Age of Onset    Cancer Father         prostate    Cancer Paternal Aunt         breast    Cancer Paternal Aunt         breast      Social History:   Social History     Socioeconomic History    Marital status:    Tobacco Use    Smoking status: Never    Smokeless tobacco: Never   Substance and Sexual Activity    Alcohol use: Yes     Comment: wine, 2-3 nights a week    Drug use: No   Social History Narrative    ** Merged History Encounter **          Social Drivers of Health      Received from [facility]        Medications (Active prior to today's visit):  Current Outpatient Medications   Medication Sig Dispense Refill    polyethylene glycol, PEG 3350, 17 GM/SCOOP Oral Powder Take 17 g by mouth daily. 238 g 0    amLODIPine 2.5 MG Oral Tab       rosuvastatin 10 MG Oral Tab Take 1 tablet (10 mg total) by mouth daily.      metFORMIN HCl 1000 MG Oral Tab       hydroCHLOROthiazide 12.5 MG Oral Cap Take 1 capsule (12.5 mg total) by mouth daily.      Lisinopril-Hydrochlorothiazide 10-12.5 MG Oral Tab TK 1 T PO D  0    lisinopril 20 MG Oral Tab Take 1 tablet (20 mg total) by mouth As Directed.      venlafaxine  MG Oral Capsule SR 24 Hr Take 1 capsule (150 mg total) by mouth daily. (Patient not taking: Reported on 3/20/2025)      Citalopram Hydrobromide 40 MG Oral Tab TAKE 1 TABLET BY MOUTH DAILY (Patient not taking: Reported on 2/21/2023)      Fluticasone Propionate 50 MCG/ACT Nasal Suspension by Nasal route.      MetFORMIN HCl  MG Oral Tablet 24 Hr   2    ValACYclovir HCl 500 MG Oral Tab Take  500 mg by mouth.         Allergies:  Allergies[1]    ROS:   CONSTITUTIONAL:  negative for fevers, rigors  EYES:  negative for diplopia   RESPIRATORY:  negative for severe shortness of breath  CARDIOVASCULAR:  negative for crushing sub-sternal chest pain  GASTROINTESTINAL:  see HPI  GENITOURINARY:  negative for dysuria or gross hematuria  INTEGUMENT/BREAST:  SKIN:  negative for jaundice   ALLERGIC/IMMUNOLOGIC:  negative for hay fever  ENDOCRINE:  negative for cold intolerance and heat intolerance  MUSCULOSKELETAL:  negative for joint effusion/severe erythema  BEHAVIOR/PSYCH:  negative for psychotic behavior      PHYSICAL EXAM:   Blood pressure 112/73, pulse 78, height 5' 3\" (1.6 m), weight 145 lb (65.8 kg), not currently breastfeeding.    Gen- Patient appears comfortable and in no acute discomfort  HEENT: the sclera appears anicteric, oropharynx clear, mucus membranes appear moist  CV- regular rate and rhythm, the extremities are warm and well perfused   Lung- Moves air well; No labored breathing  Abdomen- soft, non-tender exam in all quadrants without rigidity or guarding, non-distended, no abnormal bowel sounds noted, no masses are palpated  Skin- No jaundice  Ext: no cyanosis, clubbing or edema is evident.   Neuro- Alert and interactive, and gross movements of extremities normal  Psych - appropriate, non-agitated    Labs/Imaging:     Patient's pertinent labs and imaging were reviewed and discussed with patient today.      Lab Results   Component Value Date     (H) 03/20/2025     03/20/2025    K 4.0 03/20/2025     03/20/2025    CO2 25.0 03/20/2025    ANIONGAP 6 03/20/2025    BUN 17 03/20/2025    CREATSERUM 0.75 03/20/2025    BUNCREA 22.7 (H) 03/20/2025    CA 9.7 03/20/2025    OSMOCALC 286 03/20/2025    EGFRCR 89 03/20/2025    ALT 18 03/20/2025    AST 18 03/20/2025    ALKPHO 36 (L) 03/20/2025    BILT 0.4 03/20/2025    TP 7.1 03/20/2025    ALB 5.0 (H) 03/20/2025    GLOBULIN 2.1 03/20/2025     ELECTAG 2.4 (H) 03/20/2025       Lab Results   Component Value Date    RBC 4.01 03/20/2025    HGB 12.6 03/20/2025    HCT 35.6 03/20/2025    MCV 88.8 03/20/2025    MCH 31.4 03/20/2025    MCHC 35.4 03/20/2025    RDW 12.5 03/20/2025    NEPRELIM 7.34 03/20/2025    WBC 10.7 03/20/2025    .0 03/20/2025 03/20/2025 CT abdomen/pelvis    Impression   CONCLUSION:  1. Moderate to large amount of stool in colon suggests constipation.  Colonic diverticulosis.  No diverticulitis.  2. Nonspecific generalized increase in small bowel fluid may be related to stasis.  3. 11 mm incompletely characterized right hepatic lesion.  Findings probably either represent a flash filling hemangioma or a small shunt.  Nonemergent MRI could be obtained for further evaluation.        ASSESSMENT/PLAN:   Audelia Mata is a 64 year old year-old female with history of diabetes, hypertension, hyperlipidemia.   Pt was seen in the ER for left lower abdominal pain intermittent diarrhea and constipation for the last several months. Additional symptom includes bloating.   Pt has hx of diverticulosis, CT in the past showed diverticulosis.     1. Lower abdominal pain    2. Constipation, unspecified constipation type    3. Diarrhea, unspecified type      Recommendations:    # constipation, lower abdominal pain, diarrhea  - high fiber, increase fluids  - light exercise  - miralax twice a day for 1 month, then once a day for 1 month  - trial prunes, prune juice, papaya  - colace or dulcolax for increased constipation of lower abdominal pain.     - follow up in 2 months        Orders This Visit:  No orders of the defined types were placed in this encounter.      Meds This Visit:  Requested Prescriptions     Signed Prescriptions Disp Refills    polyethylene glycol, PEG 3350, 17 GM/SCOOP Oral Powder 238 g 0     Sig: Take 17 g by mouth daily.       Imaging & Referrals:  None         AYESHA Lim   3/21/2025          [1]   Allergies  Allergen  Reactions    Seasonal

## (undated) DEVICE — LINE MNTR ADLT SET O2 INTMD

## (undated) DEVICE — KIT ENDO ORCAPOD 160/180/190

## (undated) DEVICE — KIT CLEAN ENDOKIT 1.1OZ GOWNX2

## (undated) DEVICE — FORCEP RADIAL JAW 4

## (undated) DEVICE — Device: Brand: DEFENDO AIR/WATER/SUCTION AND BIOPSY VALVE

## (undated) DEVICE — ENDOSCOPY PACK - LOWER: Brand: MEDLINE INDUSTRIES, INC.

## (undated) DEVICE — 60 ML SYRINGE REGULAR TIP: Brand: MONOJECT

## (undated) NOTE — LETTER
[facility], [address] [place] [address], [address]  [phone]                12/11/17      Benjamin Arrington  [address] [facility] [address]        Dear Kerrie Dominguez,    I reviewed the pathology report

## (undated) NOTE — LETTER
10/11/2022    Irina Siu        [address] [place], [place] [id number]            Dear Irina Siu,      Our records indicate that you are due for an appointment for a Colonoscopy with Goldy Pedersen MD. Our doctors are booking out about 3-5 months for procedures. Please call our office to schedule this appointment. Your medical well-being is important to us. If your insurance requires a referral, please call your primary care office to request one.       Thank you,      [facility] and [facility] at [facility]